# Patient Record
Sex: MALE | URBAN - METROPOLITAN AREA
[De-identification: names, ages, dates, MRNs, and addresses within clinical notes are randomized per-mention and may not be internally consistent; named-entity substitution may affect disease eponyms.]

---

## 2022-03-31 ENCOUNTER — WEB ENCOUNTER (OUTPATIENT)
Dept: URBAN - METROPOLITAN AREA CLINIC 23 | Facility: CLINIC | Age: 62
End: 2022-03-31

## 2022-04-06 ENCOUNTER — TELEPHONE ENCOUNTER (OUTPATIENT)
Dept: URBAN - METROPOLITAN AREA CLINIC 92 | Facility: CLINIC | Age: 62
End: 2022-04-06

## 2022-04-06 ENCOUNTER — OFFICE VISIT (OUTPATIENT)
Dept: URBAN - METROPOLITAN AREA CLINIC 23 | Facility: CLINIC | Age: 62
End: 2022-04-06
Payer: COMMERCIAL

## 2022-04-06 ENCOUNTER — DASHBOARD ENCOUNTERS (OUTPATIENT)
Age: 62
End: 2022-04-06

## 2022-04-06 DIAGNOSIS — K86.2 PANCREATIC CYST: ICD-10-CM

## 2022-04-06 PROCEDURE — 99244 OFF/OP CNSLTJ NEW/EST MOD 40: CPT | Performed by: INTERNAL MEDICINE

## 2022-04-06 PROCEDURE — 99204 OFFICE O/P NEW MOD 45 MIN: CPT | Performed by: INTERNAL MEDICINE

## 2022-04-06 RX ORDER — LACTULOSE 10 G/10G
1 PACKET SOLUTION ORAL ONCE A DAY
Status: ON HOLD | COMMUNITY

## 2022-04-06 RX ORDER — METFORMIN HYDROCHLORIDE 500 MG/1
1 TABLET WITH A MEAL TABLET, FILM COATED ORAL ONCE A DAY
Status: ACTIVE | COMMUNITY

## 2022-04-06 RX ORDER — BACILLUS COAGULANS/INULIN 1B-250 MG
AS DIRECTED CAPSULE ORAL
Status: ON HOLD | COMMUNITY

## 2022-04-06 RX ORDER — TIZANIDINE 4 MG/1
1 TABLET AS NEEDED TABLET ORAL THREE TIMES A DAY
Status: ON HOLD | COMMUNITY

## 2022-04-06 RX ORDER — KRILL/OM-3/DHA/EPA/PHOSPHO/AST 1000-230MG
1 TABLET CAPSULE ORAL ONCE A DAY
Status: ACTIVE | COMMUNITY

## 2022-04-06 NOTE — HPI-TODAY'S VISIT:
61M referred by Dr Filemon Monique for GI eval for panc cyst.  A copy of this document will be sent to the referring physician. h/o alcoholic cirrhosis diagnosed 2020 h/o esoph variceal bleed in 2020 colon 2020 MRI 2/2022--panc head cyst 4.3cm. it was 3.5cm in 7/2021. atrophy of tail of panc. another 1.3vm cyst head/uncinate. normal size PD denies h/o pancreatitis. no FH of CA pancreas denies abd pain. no N/V.  intentional wt loss 15lb in past 6 mos. lost 70lb since hospitalization in may 2020. changed diet and stopped drinking BM every other day. formed. no diarrhea. no blood

## 2022-05-13 ENCOUNTER — LAB OUTSIDE AN ENCOUNTER (OUTPATIENT)
Dept: URBAN - METROPOLITAN AREA CLINIC 23 | Facility: CLINIC | Age: 62
End: 2022-05-13

## 2022-05-13 ENCOUNTER — OFFICE VISIT (OUTPATIENT)
Dept: URBAN - METROPOLITAN AREA MEDICAL CENTER 28 | Facility: MEDICAL CENTER | Age: 62
End: 2022-05-13
Payer: COMMERCIAL

## 2022-05-13 ENCOUNTER — TELEPHONE ENCOUNTER (OUTPATIENT)
Dept: URBAN - METROPOLITAN AREA CLINIC 92 | Facility: CLINIC | Age: 62
End: 2022-05-13

## 2022-05-13 DIAGNOSIS — R93.3 ABN FINDINGS-GI TRACT: ICD-10-CM

## 2022-05-13 DIAGNOSIS — K86.89 ACUTE PANCREATIC FLUID COLLECTION: ICD-10-CM

## 2022-05-13 DIAGNOSIS — K29.60 ADENOPAPILLOMATOSIS GASTRICA: ICD-10-CM

## 2022-05-13 LAB
GLUCOSE POC: 147
PERFORMING LAB: (no result)

## 2022-05-13 PROCEDURE — 43259 EGD US EXAM DUODENUM/JEJUNUM: CPT | Performed by: INTERNAL MEDICINE

## 2022-05-13 PROCEDURE — 43239 EGD BIOPSY SINGLE/MULTIPLE: CPT | Performed by: INTERNAL MEDICINE

## 2022-05-13 RX ORDER — LACTULOSE 10 G/10G
1 PACKET SOLUTION ORAL ONCE A DAY
Status: ON HOLD | COMMUNITY

## 2022-05-13 RX ORDER — KRILL/OM-3/DHA/EPA/PHOSPHO/AST 1000-230MG
1 TABLET CAPSULE ORAL ONCE A DAY
Status: ACTIVE | COMMUNITY

## 2022-05-13 RX ORDER — BACILLUS COAGULANS/INULIN 1B-250 MG
AS DIRECTED CAPSULE ORAL
Status: ON HOLD | COMMUNITY

## 2022-05-13 RX ORDER — TIZANIDINE 4 MG/1
1 TABLET AS NEEDED TABLET ORAL THREE TIMES A DAY
Status: ON HOLD | COMMUNITY

## 2022-05-13 RX ORDER — METFORMIN HYDROCHLORIDE 500 MG/1
1 TABLET WITH A MEAL TABLET, FILM COATED ORAL ONCE A DAY
Status: ACTIVE | COMMUNITY

## 2022-08-05 ENCOUNTER — TELEPHONE ENCOUNTER (OUTPATIENT)
Dept: URBAN - METROPOLITAN AREA CLINIC 23 | Facility: CLINIC | Age: 62
End: 2022-08-05